# Patient Record
Sex: FEMALE | Race: WHITE | Employment: FULL TIME | URBAN - METROPOLITAN AREA
[De-identification: names, ages, dates, MRNs, and addresses within clinical notes are randomized per-mention and may not be internally consistent; named-entity substitution may affect disease eponyms.]

---

## 2018-07-20 ENCOUNTER — HOSPITAL ENCOUNTER (OUTPATIENT)
Age: 48
Discharge: HOME | End: 2018-07-22
Payer: COMMERCIAL

## 2018-07-20 PROCEDURE — 88305 TISSUE EXAM BY PATHOLOGIST: CPT

## 2018-07-20 PROCEDURE — 88342 IMHCHEM/IMCYTCHM 1ST ANTB: CPT

## 2018-07-20 PROCEDURE — 88360 TUMOR IMMUNOHISTOCHEM/MANUAL: CPT

## 2018-07-20 PROCEDURE — 88341 IMHCHEM/IMCYTCHM EA ADD ANTB: CPT

## 2018-08-09 ENCOUNTER — HOSPITAL ENCOUNTER (OUTPATIENT)
Age: 48
Discharge: HOME | End: 2018-08-11
Payer: COMMERCIAL

## 2018-08-09 PROCEDURE — 88333 PATH CONSLTJ SURG CYTO XM 1: CPT

## 2018-08-09 PROCEDURE — 88329 PATH CONSLTJ DRG SURG: CPT

## 2018-08-09 PROCEDURE — 88342 IMHCHEM/IMCYTCHM 1ST ANTB: CPT

## 2018-08-09 PROCEDURE — 88305 TISSUE EXAM BY PATHOLOGIST: CPT

## 2018-08-09 PROCEDURE — 88307 TISSUE EXAM BY PATHOLOGIST: CPT

## 2018-08-09 PROCEDURE — 88341 IMHCHEM/IMCYTCHM EA ADD ANTB: CPT

## 2018-09-11 ENCOUNTER — HOSPITAL ENCOUNTER (OUTPATIENT)
Dept: RADIATION ONCOLOGY | Age: 48
Discharge: HOME | End: 2018-09-11
Payer: COMMERCIAL

## 2018-09-11 VITALS
TEMPERATURE: 98.8 F | HEART RATE: 74 BPM | HEIGHT: 64 IN | SYSTOLIC BLOOD PRESSURE: 132 MMHG | BODY MASS INDEX: 30.05 KG/M2 | WEIGHT: 176 LBS | DIASTOLIC BLOOD PRESSURE: 79 MMHG

## 2018-09-11 DIAGNOSIS — C80.1 CANCER (HCC): ICD-10-CM

## 2018-09-11 PROCEDURE — 99205 OFFICE O/P NEW HI 60 MIN: CPT | Performed by: RADIOLOGY

## 2018-09-11 PROCEDURE — 99205 OFFICE O/P NEW HI 60 MIN: CPT

## 2018-09-11 RX ORDER — HYDROCODONE BITARTRATE AND ACETAMINOPHEN 5; 325 MG/1; MG/1
TABLET ORAL
Refills: 0 | COMMUNITY
Start: 2018-08-09 | End: 2018-12-05

## 2018-09-11 RX ORDER — VITAMIN B COMPLEX
CAPSULE ORAL DAILY
COMMUNITY
End: 2018-12-05

## 2018-09-11 RX ORDER — TRETINOIN 0.4 MG/G
GEL TOPICAL
COMMUNITY
Start: 2018-06-26

## 2018-09-11 NOTE — PROGRESS NOTES
melanoma        Social History     Social History    Marital status:      Spouse name: N/A    Number of children: N/A    Years of education: N/A     Occupational History    Not on file. Social History Main Topics    Smoking status: Never Smoker    Smokeless tobacco: Never Used    Alcohol use Yes      Comment: occaisional     Drug use: No    Sexual activity: Not on file     Other Topics Concern    Not on file     Social History Narrative    No narrative on file       Occupation: Teacher   Retired:  no  If Retired, from where? 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 The patient follows with Kelly Tellez for surgical management. The patient also follows with Harsha Mercado for med onc management. The patient had a right breast lumpectomy in August 2018 and her oncotype at this time is 12. The patient did state her breast is tender post surgery but she doesn't have any current pain. [employer] 20 minutes was spent teaching with the handouts and slides. The patient and her  verbalized an understanding of the information given. 3:43 PM     Pacemaker/Defibulator/ICD:  No          FALLS RISK SCREEN  Instructions:  Assess the patient and enter the appropriate indicators that are present for fall risk identification. Total the numbers entered and assign a fall risk score from Table 2.  Reassess patient at a minimum every 12 weeks or with status change. Assessment   Date  9/11/2018     1. Mental Ability: confusion/cognitively impaired 0 (3)       2. Elimination Issues: incontinence, frequency 0 (3)       3. Ambulatory: use of assistive devices (walker, cane, off-loading devices),        attached to equipment (IV pole, oxygen) 0 (2)     4.  Sensory Limitations: dizziness, vertigo, impaired vision 0 (3)       5. Age less than 65                  0 (0)       6. Age 72 or greater 0 (1)     7. REFERRAL    · Have you had any recent falls in past 2 months: No     · Do you have difficulty  going up/down stairs: No     · Are you having difficulty walking: No     · Do you often hold onto furniture/environmental supports or feel off balance when you are walking: No     · Do you need to take rest breaks when you are walking: No     · Any pain on scale of 1-10 that limits your mobility: No no/10    IF ANY OF THE ABOVE ARE ANSWERED YES:   a. Referral request for PT sent to NP? No   · If NO, then why: No   b. NP Name: No         Distress Screening Assessment   1. Completed by the patient? Yes  2. Reviewed by RN? No  3. Triggers met for immediate intervention (score of 6 or more)? No   If Yes: a. What intervention provided: No    b. Referral made to ? No           Patient education given on radiation therapy to the right breast. The patient expresses understanding and acceptance of instructions.  Kathie Kaye 9/11/2018 11:22 AM           Kathie Kaye

## 2018-09-11 NOTE — PROGRESS NOTES
Radiation Oncology      William Vanessa. Bonita Romero  [address] [place] [address]  [phone]         *pt seen at [facility]      Referring Physician: Dr. Peg Alcantara      Primary Care Physician:No primary care provider on file. Primary Oncologist: Dr. Peg Alcantara      Diagnosis: pT1c pNo cMo R breast IDC   -ER+   -NY+   -Her2 neg    Service:  Radiation Oncology consultation performed on 9/11/18        HPI:        Mrs. Lawyer Maloney is a pleasant and articulate 51year old woman with early stage right breast cancer, ER+. She presents to discuss fractionated external beam radiation therapy in the adjuvant setting. Candelario Sam states that she felt a mass towards the end of June. Previous Mgm was negative. She presents to Dr. Sheba Frank who recommended repeat Mgm and US, which was performed at . An US guided CNB followed that demonstrated luminal A type breast cancer. She was referred to Dr. Dexter Deleon (SURG) and a BCS was performed followed by an appointment with Dr. Peg Alcantara who recommended Onco type DX (QUEVEDO= 10%) / Candelario Vargas has FU with her for these final recs --- QUEVEDO vs AI recs pending. Course and STEWART reviewed. She has right breast soreness but no other Sx. KPS 90.        -----  Pathology reviewed:        BIOP 7/20/18:  Diagnosis:  Breast, right 12:00, core biopsy: Invasive ductal carcinoma, grade 1    Breast Cancer Marker Studies:  Estrogen Receptors (ER): Positive         Percentage of cells positive: >95%         Intensity: Strong    Progesterone Receptors (NY): Positive         Percentage of cells positive: 90%         Intensity: Weak    Hormone receptor studies are performed by immunohistochemistry on  formalin-fixed, paraffin-embedded tissue (Roche Benchmark Immunostainer,  [place] Dezza anti-ER clone [id number], anti-NY clone [id number], polymer-based detection  chemistry).  ER and NY are evaluated based on the percentage of cells  showing nuclear staining with >1% current facility-administered medications for this encounter. 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 Review of Systems - History obtained from chart review and the patient  General ROS: negative  Psychological ROS: negative  Ophthalmic ROS: negative  ENT ROS: negative  Allergy and Immunology ROS: negative  Hematological and Lymphatic ROS: negative  Endocrine ROS: negative  Breast ROS: negative for new breast lumps  Respiratory ROS: no cough, shortness of breath, or wheezing  Cardiovascular ROS: no chest pain or dyspnea on exertion  Gastrointestinal ROS: no abdominal pain, change in bowel habits, or black or bloody stools  Genito-Urinary ROS: no dysuria, trouble voiding, or hematuria  Musculoskeletal ROS: positive for - joint pain  Neurological ROS: no TIA or stroke symptoms  Dermatological ROS: negative        Physical Exam   Constitutional: She is oriented to person, place, and time and well-developed, well-nourished, and in no distress. HENT:   Head: Normocephalic and atraumatic. Eyes: Pupils are equal, round, and reactive to light. Conjunctivae are normal.   Neck: Normal range of motion. Neck supple. Cardiovascular: Normal rate. Pulmonary/Chest: Effort normal and breath sounds normal.   Abdominal: Soft. Bowel sounds are normal. She exhibits no distension. Musculoskeletal: Normal range of motion. She exhibits no edema, tenderness or deformity. Neurological: She is alert and oriented to person, place, and time. Skin: Skin is warm and dry. direct discussion and  regarding disease management.          -sim after final recs from [facility] [place], if CHEMO, sequence RT after  -R whole breast RT (possible short course pending measurements) + LSB        Kenneth Collins MD [place] [address] Oncology  Cell: [phone]    [facility]:  [facility]: [phone]  [facility]:  [phone]   FAX:    [phone]  [address] [place] [address] Road:  [phone]   FAX:  [phone]        NOTE: This report was transcribed using voice recognition software. Every effort was made to ensure accuracy; however, inadvertent computerized transcription errors may be present.

## 2018-09-14 ENCOUNTER — PRE-PROCEDURE TELEPHONE (OUTPATIENT)
Dept: RADIATION ONCOLOGY | Age: 48
End: 2018-09-14

## 2018-09-17 ENCOUNTER — HOSPITAL ENCOUNTER (OUTPATIENT)
Dept: RADIATION ONCOLOGY | Age: 48
Discharge: HOME | End: 2018-09-17
Payer: COMMERCIAL

## 2018-09-18 PROCEDURE — 77290 THER RAD SIMULAJ FIELD CPLX: CPT

## 2018-09-18 PROCEDURE — 77334 RADIATION TREATMENT AID(S): CPT

## 2018-09-20 ENCOUNTER — HOSPITAL ENCOUNTER (OUTPATIENT)
Dept: RADIATION ONCOLOGY | Age: 48
End: 2018-09-20
Payer: COMMERCIAL

## 2018-09-20 PROCEDURE — 77300 RADIATION THERAPY DOSE PLAN: CPT

## 2018-09-20 PROCEDURE — 77295 3-D RADIOTHERAPY PLAN: CPT

## 2018-09-20 PROCEDURE — 77334 RADIATION TREATMENT AID(S): CPT

## 2018-09-25 PROCEDURE — 77412 RADIATION TX DELIVERY LVL 3: CPT

## 2018-09-25 PROCEDURE — 77417 THER RADIOLOGY PORT IMAGE(S): CPT

## 2018-09-25 PROCEDURE — 77290 THER RAD SIMULAJ FIELD CPLX: CPT

## 2018-09-25 PROCEDURE — 77334 RADIATION TREATMENT AID(S): CPT

## 2018-09-26 PROCEDURE — 77412 RADIATION TX DELIVERY LVL 3: CPT

## 2018-09-27 PROCEDURE — 77412 RADIATION TX DELIVERY LVL 3: CPT

## 2018-09-28 PROCEDURE — 77412 RADIATION TX DELIVERY LVL 3: CPT

## 2018-10-01 ENCOUNTER — HOSPITAL ENCOUNTER (OUTPATIENT)
Dept: RADIATION ONCOLOGY | Age: 48
Discharge: HOME | End: 2018-10-01
Payer: COMMERCIAL

## 2018-10-01 PROCEDURE — 77336 RADIATION PHYSICS CONSULT: CPT

## 2018-10-01 PROCEDURE — 77412 RADIATION TX DELIVERY LVL 3: CPT

## 2018-10-02 ENCOUNTER — HOSPITAL ENCOUNTER (OUTPATIENT)
Dept: RADIATION ONCOLOGY | Age: 48
Discharge: HOME | End: 2018-10-02
Payer: COMMERCIAL

## 2018-10-02 VITALS
SYSTOLIC BLOOD PRESSURE: 130 MMHG | BODY MASS INDEX: 29.87 KG/M2 | WEIGHT: 174 LBS | DIASTOLIC BLOOD PRESSURE: 78 MMHG | HEART RATE: 83 BPM

## 2018-10-02 PROCEDURE — 77417 THER RADIOLOGY PORT IMAGE(S): CPT

## 2018-10-02 PROCEDURE — 77412 RADIATION TX DELIVERY LVL 3: CPT

## 2018-10-03 ENCOUNTER — PRE-PROCEDURE TELEPHONE (OUTPATIENT)
Dept: RADIATION ONCOLOGY | Age: 48
End: 2018-10-03

## 2018-10-03 PROCEDURE — 77412 RADIATION TX DELIVERY LVL 3: CPT

## 2018-10-04 PROCEDURE — 77412 RADIATION TX DELIVERY LVL 3: CPT

## 2018-10-05 PROCEDURE — 77412 RADIATION TX DELIVERY LVL 3: CPT

## 2018-10-08 PROCEDURE — 77412 RADIATION TX DELIVERY LVL 3: CPT

## 2018-10-08 PROCEDURE — 77336 RADIATION PHYSICS CONSULT: CPT

## 2018-10-09 ENCOUNTER — HOSPITAL ENCOUNTER (OUTPATIENT)
Dept: RADIATION ONCOLOGY | Age: 48
Discharge: HOME | End: 2018-10-09
Payer: COMMERCIAL

## 2018-10-09 VITALS
SYSTOLIC BLOOD PRESSURE: 135 MMHG | BODY MASS INDEX: 30.04 KG/M2 | DIASTOLIC BLOOD PRESSURE: 77 MMHG | HEART RATE: 77 BPM | WEIGHT: 175 LBS

## 2018-10-09 PROCEDURE — 77417 THER RADIOLOGY PORT IMAGE(S): CPT

## 2018-10-09 PROCEDURE — 77412 RADIATION TX DELIVERY LVL 3: CPT

## 2018-10-10 PROCEDURE — 77412 RADIATION TX DELIVERY LVL 3: CPT

## 2018-10-10 PROCEDURE — 77334 RADIATION TREATMENT AID(S): CPT

## 2018-10-10 PROCEDURE — 77300 RADIATION THERAPY DOSE PLAN: CPT

## 2018-10-11 PROCEDURE — 77412 RADIATION TX DELIVERY LVL 3: CPT

## 2018-10-12 PROCEDURE — 77412 RADIATION TX DELIVERY LVL 3: CPT

## 2018-10-15 PROCEDURE — 77336 RADIATION PHYSICS CONSULT: CPT

## 2018-10-15 PROCEDURE — 77412 RADIATION TX DELIVERY LVL 3: CPT

## 2018-10-16 ENCOUNTER — HOSPITAL ENCOUNTER (OUTPATIENT)
Dept: RADIATION ONCOLOGY | Age: 48
Discharge: HOME | End: 2018-10-16
Payer: COMMERCIAL

## 2018-10-16 VITALS
BODY MASS INDEX: 30.04 KG/M2 | WEIGHT: 175 LBS | DIASTOLIC BLOOD PRESSURE: 80 MMHG | HEART RATE: 71 BPM | SYSTOLIC BLOOD PRESSURE: 120 MMHG

## 2018-10-16 PROCEDURE — 77417 THER RADIOLOGY PORT IMAGE(S): CPT

## 2018-10-16 PROCEDURE — 77412 RADIATION TX DELIVERY LVL 3: CPT

## 2018-10-16 RX ORDER — TAMOXIFEN CITRATE 20 MG/1
TABLET ORAL
Refills: 0 | COMMUNITY
Start: 2018-10-11

## 2018-10-17 PROCEDURE — 77412 RADIATION TX DELIVERY LVL 3: CPT

## 2018-10-18 PROCEDURE — 77412 RADIATION TX DELIVERY LVL 3: CPT

## 2018-10-19 PROCEDURE — 77412 RADIATION TX DELIVERY LVL 3: CPT

## 2018-10-22 ENCOUNTER — TELEPHONE (OUTPATIENT)
Dept: RADIATION ONCOLOGY | Age: 48
End: 2018-10-22

## 2018-10-22 PROCEDURE — 77336 RADIATION PHYSICS CONSULT: CPT

## 2018-10-22 PROCEDURE — 77412 RADIATION TX DELIVERY LVL 3: CPT

## 2018-10-22 NOTE — TELEPHONE ENCOUNTER
Spoke with pt re: ACS programs, resources, and role of ACS navigator. Pt stated that she has no needs at this time but would follow up if needs arise. Provided with ACS navigator's contact information.

## 2018-10-23 PROCEDURE — 77412 RADIATION TX DELIVERY LVL 3: CPT

## 2018-10-23 NOTE — PROGRESS NOTES
Deysi Pastor  10/23/2018  3:40 PM          Current Outpatient Prescriptions   Medication Sig Dispense Refill    tamoxifen (NOLVADEX) 20 MG tablet take 1 tablet by mouth once daily . Kavya Venturaeling TAKE WHOLE WITH WATER  0    HYDROcodone-acetaminophen (NORCO) 5-325 MG per tablet take 1 to 2 tablets by mouth every 6 hours if needed for pain  0    tretinoin microspheres (RETIN-A MICRO) 0.04 % gel       Multiple Vitamins-Minerals (MULTI ADULT GUMMIES PO) Take by mouth      b complex vitamins capsule Take by mouth daily      Probiotic Product (PROBIOTIC-10 PO) Take by mouth      Cetirizine HCl (ZYRTEC ALLERGY PO) Take by mouth      Turmeric (CURCUMIN 95 PO) Take by mouth       No current facility-administered medications for this encounter. This is an up-to-date medication list.    Please take this list to your next care provider, and discard any previous medication lists.

## 2018-12-05 ENCOUNTER — HOSPITAL ENCOUNTER (OUTPATIENT)
Dept: RADIATION ONCOLOGY | Age: 48
Discharge: HOME | End: 2018-12-05
Payer: COMMERCIAL

## 2018-12-05 VITALS
HEART RATE: 83 BPM | WEIGHT: 179 LBS | DIASTOLIC BLOOD PRESSURE: 82 MMHG | RESPIRATION RATE: 18 BRPM | BODY MASS INDEX: 30.73 KG/M2 | TEMPERATURE: 98 F | SYSTOLIC BLOOD PRESSURE: 126 MMHG

## 2018-12-05 DIAGNOSIS — Z17.0 MALIGNANT NEOPLASM OF RIGHT BREAST IN FEMALE, ESTROGEN RECEPTOR POSITIVE, UNSPECIFIED SITE OF BREAST (HCC): Primary | ICD-10-CM

## 2018-12-05 DIAGNOSIS — C50.911 MALIGNANT NEOPLASM OF RIGHT BREAST IN FEMALE, ESTROGEN RECEPTOR POSITIVE, UNSPECIFIED SITE OF BREAST (HCC): Primary | ICD-10-CM

## 2018-12-05 PROCEDURE — 99999 PR OFFICE/OUTPT VISIT,PROCEDURE ONLY: CPT | Performed by: NURSE PRACTITIONER

## 2018-12-05 RX ORDER — GLUCOSAMINE/D3/BOSWELLIA SERRA 1500MG-400
1 TABLET ORAL DAILY
COMMUNITY

## 2019-03-26 ENCOUNTER — HOSPITAL ENCOUNTER (OUTPATIENT)
Dept: RADIATION ONCOLOGY | Age: 49
Discharge: HOME | End: 2019-03-26
Payer: COMMERCIAL

## 2019-03-26 ENCOUNTER — TELEPHONE (OUTPATIENT)
Dept: CASE MANAGEMENT | Age: 49
End: 2019-03-26

## 2019-03-26 DIAGNOSIS — C50.911 MALIGNANT NEOPLASM OF RIGHT BREAST IN FEMALE, ESTROGEN RECEPTOR POSITIVE, UNSPECIFIED SITE OF BREAST (HCC): Primary | ICD-10-CM

## 2019-03-26 DIAGNOSIS — Z17.0 MALIGNANT NEOPLASM OF RIGHT BREAST IN FEMALE, ESTROGEN RECEPTOR POSITIVE, UNSPECIFIED SITE OF BREAST (HCC): Primary | ICD-10-CM

## 2019-03-26 PROCEDURE — 99213 OFFICE O/P EST LOW 20 MIN: CPT | Performed by: NURSE PRACTITIONER

## 2019-03-26 NOTE — PROGRESS NOTES
education: Not on file    Highest education level: Not on file   Occupational History    Not on file   Social Needs    Financial resource strain: Not on file    Food insecurity:     Worry: Not on file     Inability: Not on file    Transportation needs:     Medical: Not on file     Non-medical: Not on file   Tobacco Use    Smoking status: Never Smoker    Smokeless tobacco: Never Used   Substance and Sexual Activity    Alcohol use: Yes     Comment: occ    Drug use: No    Sexual activity: Not on file   Lifestyle    Physical activity:     Days per week: Not on file     Minutes per session: Not on file    Stress: Not on file   Relationships    Social connections:     Talks on phone: Not on file     Gets together: Not on file     Attends Roman [employer] service: Not on file     Active member of club or organization: Not on file     Attends meetings of clubs or organizations: Not on file     Relationship status: Not on file    Intimate partner violence:     Fear of current or ex partner: Not on file     Emotionally abused: Not on file     Physically abused: Not on file     Forced sexual activity: Not on file   Other Topics Concern    Not on file   Social History Narrative    Lives in [place] with Janes Alvarez ([employer]bie [employer]ibio), 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 Family History   Problem Relation Age of Onset    Cancer Maternal Aunt         leg carcinoma     Cancer Maternal Grandfather         melanoma        Allergies:   Patient has no known allergies. Current Outpatient Medications   Medication Sig Dispense Refill    BLACK COHOSH HOT FLASH RELIEF PO Take 1 tablet by mouth daily      Biotin 35576 MCG TABS Take 1 capsule by mouth daily      tamoxifen (NOLVADEX) 20 MG tablet take 1 tablet by mouth once daily . Gaviota Nery TAKE WHOLE WITH WATER  0    Multiple Vitamins-Minerals (MULTI ADULT GUMMIES PO) Take by mouth      Cetirizine HCl (ZYRTEC ALLERGY PO) Take by mouth daily       Turmeric (CURCUMIN 95 PO) Take by mouth  tretinoin microspheres (RETIN-A MICRO) 0.04 % gel        No current facility-administered medications for this encounter. REVIEW OF SYSTEMS:    Constitutional:  No fever chills or rigors. Eyes: No changes in vision, discharge, or pain  ENT: No Headaches, hearing loss or vertigo. No mouth sores or sore throat. No change in taste or smell. Cardiovascular: No chest discomfort, dyspnea on exertion, palpitations or loss of consciousness or phlebitis. Respiratory: Has no cough or wheezing, Has no sputum production. Has no hemoptysis, has no pleuritic pain. Gastrointestinal: No abdominal pain, appetite loss, blood in stools. No change in bowel habits. No hematemesis   Genitourinary: Patient acknowledges no dysuria, trouble voiding, or hematuria. No nocturia or increased frequency. Musculoskeletal: No gait disturbance, weakness or joint complaints. Integumentary: No rash or pruritis. Neurological: No headache, diplopia, change in muscle strength, numbness or tingling. No change in gait, balance, coordination, mood, affect, memory, mentation, behavior. Psychiatric: No anxiety, or depression. Endocrine: No temperature intolerance. No excessive thirst, fluid intake, or urination. No tremor. Hematologic/Lymphatic: No abnormal bruising or bleeding, blood clots or swollen lymph nodes. Allergic/Immunologic: No nasal congestion or hives. PHYSICAL EXAMINATION: There were no vitals filed for this visit. There is no height or weight on file to calculate BMI. Appearance: Well-developed, well-nourished 47year old female. Skin: Irradiated skin intact, no erythema. Head: Normocephalic, atraumatic  Eyes: EOMI, no conjunctival erythema  ENMT: No pharyngeal erythema, MMM, no rhinorrhea. Neck: Supple, no elevated JVP  Lungs: Clear to auscultation bilaterally. No wheezes, rales or rhonchi. Cardiac: Regular rate and rhythm, +S1S2, no murmurs apparent  Abdomen: Soft, round and non-tender.  Bowel sounds present x 4. Extremities: Moves all extremities x 4, no lower extremity edema  Neurologic: Alert and oriented x 3. No focal motor deficits apparent         IMAGING:    MAMMOGRAM: due April 2019     ASSESSMENT/PLAN:  Patient is doing well post-radiation completion. Continue routine SBEs, report any abnormal findings to physician/ APPs. Mammogram per Medical Oncology      Skin care discussed. Gentle moisturizing lotion daily as needed. Increase physical activity. Encourage to start walking. Provided information about [facility] [employer] exercise program for Cancer survivors. I discussed follow up plans with Jose Lucia. Radiation Oncology follow-up 6 months, patient prefers to follow with CNP at [facility] location. Instructed to follow up with other physicians/ APPs involved in her care as directed (including but not limited to Medical Oncology, Surgery, Primary Care and Gynecology). The patient was given our contact number in the event that if at any time they change their mind and would like to return to the clinic to see either myself or one of the Radiation Oncologists, they can simply call us and we would be happy to see them. Thank you for involving us in the management of this extremely pleasant patient. More than 25 min was in direct contact with pt coordinating/giving care. >50% of the visit was spent in counseling the pt on the following: Follow up care    The nurses notes were reviewed and incorporated into this assessment and plan.       Sincerely,    Kiana Mclean, MSN, APRN, CNP  Certified Nurse Practitioner for [address] [place] [address]/ [facility]   P: [phone] / Vanessa Prose: [phone]

## 2019-09-24 ENCOUNTER — HOSPITAL ENCOUNTER (OUTPATIENT)
Dept: RADIATION ONCOLOGY | Age: 49
Discharge: HOME | End: 2019-09-24
Payer: COMMERCIAL

## 2019-09-24 DIAGNOSIS — Z92.3 S/P RADIATION THERAPY > 12 WKS AGO: ICD-10-CM

## 2019-09-24 DIAGNOSIS — Z17.0 MALIGNANT NEOPLASM OF RIGHT BREAST IN FEMALE, ESTROGEN RECEPTOR POSITIVE, UNSPECIFIED SITE OF BREAST (HCC): Primary | ICD-10-CM

## 2019-09-24 DIAGNOSIS — C50.911 MALIGNANT NEOPLASM OF RIGHT BREAST IN FEMALE, ESTROGEN RECEPTOR POSITIVE, UNSPECIFIED SITE OF BREAST (HCC): Primary | ICD-10-CM

## 2019-09-24 PROCEDURE — 99213 OFFICE O/P EST LOW 20 MIN: CPT | Performed by: NURSE PRACTITIONER

## 2019-09-24 RX ORDER — SPIRONOLACTONE 50 MG/1
TABLET, FILM COATED ORAL
Refills: 1 | COMMUNITY
Start: 2019-09-14

## 2019-09-24 RX ORDER — VENLAFAXINE HYDROCHLORIDE 150 MG/1
150 CAPSULE, EXTENDED RELEASE ORAL DAILY
COMMUNITY
Start: 2019-07-08

## 2019-09-24 RX ORDER — ASCORBIC ACID
1 CRYSTALS ORAL
COMMUNITY

## 2019-09-24 SDOH — HEALTH STABILITY: MENTAL HEALTH: HOW OFTEN DO YOU HAVE A DRINK CONTAINING ALCOHOL?: MONTHLY OR LESS

## 2019-09-24 NOTE — PROGRESS NOTES
Radiation Oncology   6 Month Follow Up Note    09/24/2019    Imer Camara  Vitals:    09/24/19 1552   BP: 122/66   Pulse: 90   Resp: 18   Temp: 97.5 °F (36.4 °C)     Wt Readings from Last 1 Encounters:   09/24/19 180 lb 2 oz (81.7 kg)       CC: Patient is here for 6 month follow up for post-radiation completion. HPI:  Imer Camara is a pleasant 47 y.o. female with a diagnosis of hormone positive right breast cancer. Status post lumpectomy. The patient underwent a course of radiation therapy to the right breast + lumpectomy site boost, which was completed on 10/23/2018. Patient seen today in 6 month post radiation completion follow-up visit. Denies skin complaints or pain complaints to treatment site. Denies swelling. No longer performing breast/ scar massage or applying lotion to treatment site skin. Performs self breast exams- denies new lumps, bumps, rashes or nipple discharge. No fevers, chills, nausea or decreased appetite. On Tamoxifen as prescribed per Med-Onc. Complains of hot flashes- tolerable now taking increased dose of Effexor- per Med-Onc. Complains of weight gain. Patient reports bilateral mammogram negative for malignant findings, completed April 29, 2019 at [facility] (official report unavailable). Patient states she is back to school, works full-time as an . Patient is following with Dr. Ahsan Viramontes for Medical Oncology,  Dr. Vitor Drummond for Primary Care.      Past Medical History:   Diagnosis Date    Cancer ([facility] [address]     Seasonal allergic rhinitis        Past Surgical History:   Procedure Laterality Date    BREAST LUMPECTOMY      CHOLECYSTECTOMY      WISDOM TOOTH EXTRACTION         Social History     Socioeconomic History    Marital status:      Spouse name: Amber Guerra Number of children: 2    Years of education: Not on file    Highest education level: Not on file   Occupational History    Not on file   Social Needs    Financial resource strain: Not on file    Food insecurity:     Worry: Not on file     Inability: Not on file    Transportation needs:     Medical: Not on file     Non-medical: Not on file   Tobacco Use    Smoking status: Never Smoker    Smokeless tobacco: Never Used   Substance and Sexual Activity    Alcohol use: Yes     Frequency: Monthly or less    Drug use: No    Sexual activity: Not on file   Lifestyle    Physical activity:     Days per week: Not on file     Minutes per session: Not on file    Stress: Not on file   Relationships    Social connections:     Talks on phone: Not on file     Gets together: Not on file     Attends Muslim service: Not on file     Active member of club or organization: Not on file     Attends meetings of clubs or organizations: Not on file     Relationship status: Not on file    Intimate partner violence:     Fear of current or ex partner: Not on file     Emotionally abused: Not on file     Physically abused: Not on file     Forced sexual activity: Not on file   Other Topics Concern    Not on file   Social History Narrative    Lives in [place] with Colleen De Los Santos (Jmael Baldwin), 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 Family History   Problem Relation Age of Onset    Cancer Maternal Aunt         leg carcinoma     Cancer Maternal Grandfather         melanoma      Allergies:   Patient has no known allergies. Current Outpatient Medications   Medication Sig Dispense Refill    venlafaxine (EFFEXOR XR) 150 MG extended release capsule Take 150 mg by mouth daily      spironolactone (ALDACTONE) 50 MG tablet   1    Ca Phosphate-Cholecalciferol (CALTRATE GUMMY BITES) 250-400 MG-UNIT CHEW       Vitamin E 200 units TABS Take 1 tablet by mouth every morning (before breakfast)      Biotin 46768 MCG TABS Take 1 capsule by mouth daily      tamoxifen (NOLVADEX) 20 MG tablet take 1 tablet by mouth once daily . Michelle Brumfield TAKE WHOLE WITH WATER  0    Multiple Vitamins-Minerals (MULTI ADULT GUMMIES PO) Take by mouth

## 2019-09-25 VITALS
WEIGHT: 180.13 LBS | DIASTOLIC BLOOD PRESSURE: 66 MMHG | TEMPERATURE: 97.5 F | BODY MASS INDEX: 30.75 KG/M2 | HEART RATE: 90 BPM | HEIGHT: 64 IN | RESPIRATION RATE: 18 BRPM | SYSTOLIC BLOOD PRESSURE: 122 MMHG